# Patient Record
Sex: MALE | Race: BLACK OR AFRICAN AMERICAN
[De-identification: names, ages, dates, MRNs, and addresses within clinical notes are randomized per-mention and may not be internally consistent; named-entity substitution may affect disease eponyms.]

---

## 2019-01-13 ENCOUNTER — HOSPITAL ENCOUNTER (EMERGENCY)
Dept: HOSPITAL 91 - FTE | Age: 34
Discharge: HOME | End: 2019-01-13
Payer: COMMERCIAL

## 2019-01-13 ENCOUNTER — HOSPITAL ENCOUNTER (EMERGENCY)
Dept: HOSPITAL 10 - FTE | Age: 34
Discharge: HOME | End: 2019-01-13
Payer: COMMERCIAL

## 2019-01-13 VITALS — SYSTOLIC BLOOD PRESSURE: 147 MMHG | DIASTOLIC BLOOD PRESSURE: 100 MMHG | HEART RATE: 106 BPM | RESPIRATION RATE: 19 BRPM

## 2019-01-13 VITALS
HEIGHT: 73 IN | HEIGHT: 73 IN | BODY MASS INDEX: 31.18 KG/M2 | WEIGHT: 235.23 LBS | BODY MASS INDEX: 31.18 KG/M2 | WEIGHT: 235.23 LBS

## 2019-01-13 DIAGNOSIS — Z23: ICD-10-CM

## 2019-01-13 DIAGNOSIS — F17.210: ICD-10-CM

## 2019-01-13 DIAGNOSIS — S01.111A: Primary | ICD-10-CM
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
## 2019-01-13 LAB
HAAIG REFLEX: (no result)
HEPATITIS B CORE ANTIBODY: NEGATIVE
HEPATITIS B SURFACE ANTIGEN: NEGATIVE
HEPATITIS C VIRAL ANTIBODY: NEGATIVE
HIV 1&2 ANTIBODY: NEGATIVE

## 2019-01-13 PROCEDURE — 90715 TDAP VACCINE 7 YRS/> IM: CPT

## 2019-01-13 PROCEDURE — 86704 HEP B CORE ANTIBODY TOTAL: CPT

## 2019-01-13 PROCEDURE — 36415 COLL VENOUS BLD VENIPUNCTURE: CPT

## 2019-01-13 PROCEDURE — 86709 HEPATITIS A IGM ANTIBODY: CPT

## 2019-01-13 PROCEDURE — 87340 HEPATITIS B SURFACE AG IA: CPT

## 2019-01-13 PROCEDURE — 86703 HIV-1/HIV-2 1 RESULT ANTBDY: CPT

## 2019-01-13 PROCEDURE — 12013 RPR F/E/E/N/L/M 2.6-5.0 CM: CPT

## 2019-01-13 PROCEDURE — 87536 HIV-1 QUANT&REVRSE TRNSCRPJ: CPT

## 2019-01-13 PROCEDURE — 86803 HEPATITIS C AB TEST: CPT

## 2019-01-13 PROCEDURE — 99283 EMERGENCY DEPT VISIT LOW MDM: CPT

## 2019-01-13 PROCEDURE — 90471 IMMUNIZATION ADMIN: CPT

## 2019-01-13 RX ADMIN — CLOSTRIDIUM TETANI TOXOID ANTIGEN (FORMALDEHYDE INACTIVATED), CORYNEBACTERIUM DIPHTHERIAE TOXOID ANTIGEN (FORMALDEHYDE INACTIVATED), BORDETELLA PERTUSSIS TOXOID ANTIGEN (GLUTARALDEHYDE INACTIVATED), BORDETELLA PERTUSSIS FILAMENTOUS HEMAGGLUTININ ANTIGEN (FORMALDEHYDE INACTIVATED), BORDETELLA PERTUSSIS PERTACTIN ANTIGEN, AND BORDETELLA PERTUSSIS FIMBRIAE 2/3 ANTIGEN 1 ML: 5; 2; 2.5; 5; 3; 5 INJECTION, SUSPENSION INTRAMUSCULAR at 09:28

## 2019-01-13 RX ADMIN — LIDOCAINE HYDROCHLORIDE 1 ML: 10 INJECTION, SOLUTION INFILTRATION; PERINEURAL at 09:28

## 2019-01-13 NOTE — ERD
ER Documentation


Chief Complaint


Chief Complaint





laceration @ right eyebrow area





HPI


33-year-old male presents with complaint of laceration over the right eyebrow.  


Patient states that 4 hours ago he rolled out of bed and his glasses cut him.  


Denies any chance of glass other debris inside the wound.  States that he is not


up-to-date on his tetanus but has had vaccine series as a child.  Denies past 


medical history. Denies allergies. Denies medications. Denies surgeries. Denies 


alcohol, tobacco, drug use.  Up to date on vaccines.





ROS


All systems reviewed and are negative except as per history of present illness.





Allergies


Allergies:  


Coded Allergies:  


     No Known Allergy (Unverified , 1/13/19)





PMhx/Soc


Medical and Surgical Hx:  pt denies Medical Hx


History of Surgery:  Yes (dental sx ,biopsy)


Anesthesia Reaction:  No


Hx Alcohol Use:  Yes


Hx Substance Use:  No


Smoking Status:  Current every day smoker





FmHx


Family History:  No diabetes, No coronary disease, No other





Physical Exam


Vitals





Vital Signs


  Date      Temp  Pulse  Resp  B/P (MAP)   Pulse Ox  O2          O2 Flow    FiO2


Time                                                 Delivery    Rate


   1/13/19  97.9    106    19     147/100       100


     07:57                          (116)





Physical Exam


Const:   No acute distress


Head:   3 cm full-thickness laceration noted lateral to the right eyebrow.  No 


signs of foreign bodies or debris noted.  No signs of infection noted.


Resp:   Clear to auscultation bilaterally


Cardio:   Regular rate and rhythm, no murmurs


Neur:   Awake and alert


Psych:    Normal Mood and Affect


Results 24 hrs





Laboratory Tests


                 Test
                            1/13/19
10:42


                 Hepatitis B Surface Antigen      NEGATIVE


                 Hepatitis B Core Total
Antibody  NEGATIVE 



                 Hepatitis C Antibody             NEGATIVE


                 HIV (1&2) Antibody               NEGATIVE





Current Medications


 Medications
   Dose
          Sig/Nikhil
       Start Time
   Status  Last


 (Trade)       Ordered        Route
 PRN     Stop Time              Admin
Dose


                              Reason                                Admin


 Diphtheria/
   0.5 ml         ONCE ONCE
     1/13/19       DC           1/13/19


Tetanus/Acell                 IM*
           09:30
                       09:28




 Pertussis
                                 1/13/19 09:31


(Adacel)


 Lidocaine
     20 ml          ONCE  ONCE
    1/13/19       DC       



(Xylocaine                    SC
            09:30



1%
  (Mdv) 20                                1/13/19 09:31


ml)








Procedures/MDM


ER Course: TDAP. HIV, Hep panel - WNL





Laceration Repair by me:


Anesthesia:         1% lidocaine locally


Location:         Lateral to right eyebrow.


Tendon/Joint/Nerves:      No injury


Foreign body:         None detected after copious irrigation and exploration


Technique:         Simple Interrupted Sutures


Complexity:         No subcutaneous sutures/mucosal repair/edge excision


Post Closure Length:      4 cm





Patient's bleeding was easily controlled in the department and there is no 


indication of anemia.


No evidence of compartment syndrome, neurologic injury, vascular injury, open 


joint, tendon laceration, or foreign body.


Patient is appropriate for outpatient follow up.





48 hour wound check.  Scar minimization instructions given.





MDM: 33-year-old male presents with complaint of laceration over the right 


eyebrow.  Patient states that 4 hours ago he rolled out of bed and his glasses 


cut him.  Denies any chance of glass other debris inside the wound.  States that


he is not up-to-date on his tetanus but has had vaccine series as a child.  


Suture was repaired according to the technique described above.  Patient stated 


that he received vaccination series as a child but was not up-to-date on his 


tetanus, Tdap was given.  During lack repair procedure I was accidentally stuck 


with the suture needle.  Patient consented to have hepatitis and HIV panel 


performed.  Results were within normal limits.  Patient advised to return in 2 


days for wound check.  Patient discharged with strict ER precautions. Patient 


advised to follow up with PMD. All questions answered at discharge.





Departure


Diagnosis:  


   Primary Impression:  


   Laceration


Condition:  Stable











LORENZO DENISE               Jan 13, 2019 09:22